# Patient Record
Sex: FEMALE | Race: WHITE | NOT HISPANIC OR LATINO | URBAN - METROPOLITAN AREA
[De-identification: names, ages, dates, MRNs, and addresses within clinical notes are randomized per-mention and may not be internally consistent; named-entity substitution may affect disease eponyms.]

---

## 2021-05-27 ENCOUNTER — CLAIMS CREATED FROM THE CLAIM WINDOW (OUTPATIENT)
Dept: URBAN - METROPOLITAN AREA CLINIC 113 | Facility: CLINIC | Age: 21
End: 2021-05-27
Payer: COMMERCIAL

## 2021-05-27 ENCOUNTER — WEB ENCOUNTER (OUTPATIENT)
Dept: URBAN - METROPOLITAN AREA CLINIC 113 | Facility: CLINIC | Age: 21
End: 2021-05-27

## 2021-05-27 VITALS
HEIGHT: 64 IN | HEART RATE: 72 BPM | WEIGHT: 194 LBS | DIASTOLIC BLOOD PRESSURE: 72 MMHG | SYSTOLIC BLOOD PRESSURE: 111 MMHG | TEMPERATURE: 98.6 F | BODY MASS INDEX: 33.12 KG/M2

## 2021-05-27 DIAGNOSIS — R19.8 ALTERNATING CONSTIPATION AND DIARRHEA: ICD-10-CM

## 2021-05-27 DIAGNOSIS — R10.84 GENERALIZED ABDOMINAL PAIN: ICD-10-CM

## 2021-05-27 PROCEDURE — 74177 CT ABD & PELVIS W/CONTRAST: CPT | Performed by: INTERNAL MEDICINE

## 2021-05-27 PROCEDURE — 99244 OFF/OP CNSLTJ NEW/EST MOD 40: CPT | Performed by: INTERNAL MEDICINE

## 2021-05-27 RX ORDER — DICYCLOMINE HYDROCHLORIDE 10 MG/1
1 CAPSULE CAPSULE ORAL
Qty: 60 | Refills: 1 | OUTPATIENT
Start: 2021-05-27 | End: 2021-07-26

## 2021-05-27 NOTE — HPI-TODAY'S VISIT:
22yo female referred by Dr. Anitra Jackson for evaluation of abdominal pain. A copy of this document is being forwarded to the referring provider.  She reports a several year history of intermittent abdominal pain and fluctuating bowel habits.  She will go for days without a bowel movement, followed by 1-2 normal stools and then the onset of diarrhea which may last several days.  She started a daily probiotic 2 weeks ago, without change.  On occasion, Pepto-Bismol will provide temporary relief.  She complains of sharp, cramping mid to lower abdominal pain, which is often worsened prior to a bowel movement.  She does not always have relief with defecation.  She complains of the frequent urge to defecate without response.  She denies blood per rectum.  She does awaken during the night for a bowel movement about once every 2 weeks.  No unintentional weight loss.  Within the last several weeks, she has experienced nausea before and after meals.  She denies vomiting.  There is no family history of GI malignancy or IBD.

## 2021-05-29 LAB
A/G RATIO: 1.7
ALBUMIN: 4.4
ALKALINE PHOSPHATASE: 55
ALT (SGPT): 14
AST (SGOT): 19
BASO (ABSOLUTE): 0.1
BASOS: 1
BILIRUBIN, TOTAL: 0.4
BUN/CREATININE RATIO: 16
BUN: 11
CALCIUM: 9.5
CARBON DIOXIDE, TOTAL: 24
CHLORIDE: 102
CREATININE: 0.7
DEAMIDATED GLIADIN ABS, IGA: 4
DEAMIDATED GLIADIN ABS, IGG: 2
EGFR IF AFRICN AM: 141
EGFR IF NONAFRICN AM: 123
ENDOMYSIAL ANTIBODY IGA: NEGATIVE
EOS (ABSOLUTE): 0.3
EOS: 3
GLOBULIN, TOTAL: 2.6
GLUCOSE: 77
HEMATOCRIT: 40
HEMATOLOGY COMMENTS:: (no result)
HEMOGLOBIN: 13.8
IMMATURE CELLS: (no result)
IMMATURE GRANS (ABS): 0
IMMATURE GRANULOCYTES: 0
IMMUNOGLOBULIN A, QN, SERUM: 144
LIPASE: 20
LYMPHS (ABSOLUTE): 2.3
LYMPHS: 23
MCH: 30.4
MCHC: 34.5
MCV: 88
MONOCYTES(ABSOLUTE): 0.8
MONOCYTES: 8
NEUTROPHILS (ABSOLUTE): 6.4
NEUTROPHILS: 65
NRBC: (no result)
PLATELETS: 251
POTASSIUM: 4.1
PROTEIN, TOTAL: 7
RBC: 4.54
RDW: 12.8
SODIUM: 140
T-TRANSGLUTAMINASE (TTG) IGA: <2
T-TRANSGLUTAMINASE (TTG) IGG: 2
WBC: 9.9

## 2021-06-01 ENCOUNTER — TELEPHONE ENCOUNTER (OUTPATIENT)
Dept: URBAN - METROPOLITAN AREA CLINIC 113 | Facility: CLINIC | Age: 21
End: 2021-06-01

## 2021-06-15 ENCOUNTER — LAB OUTSIDE AN ENCOUNTER (OUTPATIENT)
Dept: URBAN - METROPOLITAN AREA CLINIC 113 | Facility: CLINIC | Age: 21
End: 2021-06-15

## 2021-06-15 ENCOUNTER — TELEPHONE ENCOUNTER (OUTPATIENT)
Dept: URBAN - METROPOLITAN AREA CLINIC 113 | Facility: CLINIC | Age: 21
End: 2021-06-15

## 2021-06-15 RX ORDER — DICYCLOMINE HYDROCHLORIDE 10 MG/1
1 CAPSULE CAPSULE ORAL
Qty: 60 | Refills: 1 | Status: ACTIVE | COMMUNITY
Start: 2021-05-27 | End: 2021-07-26

## 2021-06-15 RX ORDER — SODIUM, POTASSIUM,MAG SULFATES 17.5-3.13G
354 ML SOLUTION, RECONSTITUTED, ORAL ORAL ONCE
Qty: 354 MILLILITER | Refills: 0 | OUTPATIENT
Start: 2021-06-15 | End: 2021-06-16

## 2021-06-18 ENCOUNTER — ERX REFILL RESPONSE (OUTPATIENT)
Dept: URBAN - METROPOLITAN AREA CLINIC 113 | Facility: CLINIC | Age: 21
End: 2021-06-18

## 2021-06-18 RX ORDER — DICYCLOMINE HYDROCHLORIDE 10 MG/1
1 CAPSULE CAPSULE ORAL
Qty: 60 | Refills: 1

## 2021-07-08 ENCOUNTER — TELEPHONE ENCOUNTER (OUTPATIENT)
Dept: URBAN - METROPOLITAN AREA CLINIC 113 | Facility: CLINIC | Age: 21
End: 2021-07-08

## 2021-07-08 ENCOUNTER — OFFICE VISIT (OUTPATIENT)
Dept: URBAN - METROPOLITAN AREA SURGERY CENTER 25 | Facility: SURGERY CENTER | Age: 21
End: 2021-07-08
Payer: COMMERCIAL

## 2021-07-08 DIAGNOSIS — K63.5 BENIGN COLON POLYP: ICD-10-CM

## 2021-07-08 PROCEDURE — G8907 PT DOC NO EVENTS ON DISCHARG: HCPCS | Performed by: INTERNAL MEDICINE

## 2021-07-08 PROCEDURE — 45380 COLONOSCOPY AND BIOPSY: CPT | Performed by: INTERNAL MEDICINE

## 2021-07-08 RX ORDER — DICYCLOMINE HYDROCHLORIDE 20 MG/1
1 TABLET TABLET ORAL
Qty: 45 | Refills: 6 | OUTPATIENT
Start: 2021-07-08 | End: 2022-02-02

## 2021-07-08 RX ORDER — DICYCLOMINE HYDROCHLORIDE 10 MG/1
1 CAPSULE CAPSULE ORAL
Qty: 60 | Refills: 1 | Status: ACTIVE | COMMUNITY

## 2021-07-22 ENCOUNTER — ERX REFILL RESPONSE (OUTPATIENT)
Dept: URBAN - METROPOLITAN AREA CLINIC 113 | Facility: CLINIC | Age: 21
End: 2021-07-22

## 2021-07-22 RX ORDER — DICYCLOMINE HYDROCHLORIDE 20 MG/1
1 TABLET TABLET ORAL
Qty: 45 | Refills: 6 | OUTPATIENT

## 2021-07-22 RX ORDER — DICYCLOMINE HYDROCHLORIDE 20 MG/1
TAKE 1 TABLET BY MOUTH 3 TIMES A DAY AS NEEDED FOR ABDOMINAL PAIN TABLET ORAL
Qty: 270 TABLET | Refills: 2 | OUTPATIENT

## 2021-08-17 ENCOUNTER — OFFICE VISIT (OUTPATIENT)
Dept: URBAN - METROPOLITAN AREA CLINIC 113 | Facility: CLINIC | Age: 21
End: 2021-08-17
Payer: COMMERCIAL

## 2021-08-17 VITALS
DIASTOLIC BLOOD PRESSURE: 72 MMHG | HEART RATE: 57 BPM | BODY MASS INDEX: 31.41 KG/M2 | TEMPERATURE: 97.5 F | HEIGHT: 64 IN | RESPIRATION RATE: 18 BRPM | SYSTOLIC BLOOD PRESSURE: 112 MMHG | WEIGHT: 184 LBS

## 2021-08-17 DIAGNOSIS — K58.2 IRRITABLE BOWEL SYNDROME WITH BOTH CONSTIPATION AND DIARRHEA: ICD-10-CM

## 2021-08-17 PROBLEM — 10743008 IRRITABLE BOWEL SYNDROME: Status: ACTIVE | Noted: 2021-07-08

## 2021-08-17 PROCEDURE — 99214 OFFICE O/P EST MOD 30 MIN: CPT | Performed by: INTERNAL MEDICINE

## 2021-08-17 RX ORDER — DICYCLOMINE HYDROCHLORIDE 10 MG/1
1 CAPSULE CAPSULE ORAL
Qty: 60 | Refills: 1 | Status: ON HOLD | COMMUNITY

## 2021-08-17 RX ORDER — HYOSCYAMINE SULFATE 0.12 MG/1
PLACE 1 TABLET UNDER THE TONGUE AND ALLOW TO DISSOLVE AS NEEDED FOR ABDOMINAL PAIN TABLET SUBLINGUAL
Qty: 45 | Refills: 1 | OUTPATIENT
Start: 2021-08-17 | End: 2021-09-06

## 2021-08-17 RX ORDER — DICYCLOMINE HYDROCHLORIDE 20 MG/1
TAKE 1 TABLET BY MOUTH 3 TIMES A DAY AS NEEDED FOR ABDOMINAL PAIN TABLET ORAL
Qty: 270 TABLET | Refills: 2 | Status: ACTIVE | COMMUNITY

## 2021-08-17 NOTE — HPI-TODAY'S VISIT:
22yo female presenting for follow-up after colonoscopy. She was last seen 5/27/2021 for evaluation of chronic, intermittent abdominal pain and alternating bowel habits, suspected to be related to a functional bowel disorder.  Routine labs and a CT of the abdomen and pelvis were planned.  She was recommended a daily fiber supplement with MiraLAX as needed for bowel maintenance, and dicyclomine was provided to use as needed.  A diagnostic colonoscopy was considered. Labs 5/27/2021 show a normal CBC, CMP and lipase.  Negative celiac serologies. CT of the abdomen and pelvis with contrast 6/8/2021 showed mild to moderate stool burden but was otherwise unremarkable. Due to persistent symptoms, a diagnostic colonoscopy was performed on 7/8/2021.  Findings included a 3 mm polyp in the sigmoid colon.  Normal colonic mucosa status post random biopsies which were unremarkable.  Polypectomy pathology showed benign lymphoid aggregate.  She was recommended a low FODMAP diet with plan for repeat colonoscopy at age of 48 for screening. Her symptoms nearly subsided with strict adherence to a low-FODMAP diet. However, she tried gluten-free icecream at [employer] yesterday, and experienced the sudden recurrence of abdominal pain and diarrhea. The symptoms have lessened by the time of today's visit. She otherwise had a daily nonbloody stool with psyllium. No nausea or vomiting. Dicyclomine has not been helpful.

## 2021-09-30 ENCOUNTER — OFFICE VISIT (OUTPATIENT)
Dept: URBAN - METROPOLITAN AREA CLINIC 113 | Facility: CLINIC | Age: 21
End: 2021-09-30

## 2023-04-21 ENCOUNTER — LAB OUTSIDE AN ENCOUNTER (OUTPATIENT)
Dept: URBAN - METROPOLITAN AREA CLINIC 113 | Facility: CLINIC | Age: 23
End: 2023-04-21

## 2023-04-21 ENCOUNTER — OFFICE VISIT (OUTPATIENT)
Dept: URBAN - METROPOLITAN AREA CLINIC 113 | Facility: CLINIC | Age: 23
End: 2023-04-21
Payer: COMMERCIAL

## 2023-04-21 VITALS
HEART RATE: 66 BPM | TEMPERATURE: 97.7 F | BODY MASS INDEX: 34.83 KG/M2 | WEIGHT: 204 LBS | DIASTOLIC BLOOD PRESSURE: 71 MMHG | SYSTOLIC BLOOD PRESSURE: 103 MMHG | HEIGHT: 64 IN | RESPIRATION RATE: 16 BRPM

## 2023-04-21 DIAGNOSIS — R10.9 LEFT FLANK PAIN: ICD-10-CM

## 2023-04-21 DIAGNOSIS — K58.2 IRRITABLE BOWEL SYNDROME WITH BOTH CONSTIPATION AND DIARRHEA: ICD-10-CM

## 2023-04-21 DIAGNOSIS — R10.32 LEFT LOWER QUADRANT ABDOMINAL PAIN: ICD-10-CM

## 2023-04-21 PROCEDURE — 99214 OFFICE O/P EST MOD 30 MIN: CPT | Performed by: NURSE PRACTITIONER

## 2023-04-21 RX ORDER — CHLORDIAZEPOXIDE HYDROCHLORIDE AND CLIDINIUM BROMIDE 5; 2.5 MG/1; MG/1
TAKE 1 CAPSULE 3 TIMES DAILY PRN ABDOMINAL PAINS CAPSULE, GELATIN COATED ORAL THREE TIMES A DAY
Qty: 90 | Refills: 2 | OUTPATIENT
Start: 2023-04-21 | End: 2023-07-20

## 2023-04-21 RX ORDER — DICYCLOMINE HYDROCHLORIDE 20 MG/1
TAKE 1 TABLET BY MOUTH 3 TIMES A DAY AS NEEDED FOR ABDOMINAL PAIN TABLET ORAL
Qty: 270 TABLET | Refills: 2 | Status: ACTIVE | COMMUNITY

## 2023-04-21 RX ORDER — AMITRIPTYLINE HYDROCHLORIDE 10 MG/1
1 TABLET AT BEDTIME TABLET, FILM COATED ORAL ONCE A DAY
Qty: 30 | Refills: 3 | OUTPATIENT
Start: 2023-04-21

## 2023-04-21 RX ORDER — DICYCLOMINE HYDROCHLORIDE 10 MG/1
1 CAPSULE CAPSULE ORAL
Qty: 60 | Refills: 1 | Status: ON HOLD | COMMUNITY

## 2023-04-21 NOTE — HPI-OTHER HISTORIES
A diagnostic colonoscopy was performed on 7/8/2021.  Findings included a 3 mm polyp in the sigmoid colon.  Normal colonic mucosa status post random biopsies which were unremarkable.  Polypectomy pathology showed benign lymphoid aggregate.  She was recommended a low FODMAP diet with plan for repeat colonoscopy at age of 48 for screening.  CT of the abdomen and pelvis with contrast 6/8/2021 showed mild to moderate stool burden but was otherwise unremarkable. Labs 5/27/2021 show a normal CBC, CMP and lipase.  Negative celiac serologies.
none

## 2023-04-21 NOTE — HPI-TODAY'S VISIT:
23yo female with irritable bowel syndrome presenting for long-interval follow-up. She was last seen in the office in August 2021 for follow-up regarding abdominal pain and alternating bowel habits related to irritable bowel syndrome.  Her symptoms had responded to a low FODMAP diet.  She was to continue daily fiber supplement (psyllium) and encouraged a lactose-free diet given symptom exacerbation associated with dairy consumption.  Hyoscyamine was provided to use as needed. Within the last few months, she has experienced a recurrence of abdominal pain. She complains of daily exacerbations of diffuse abdominal cramping which does respond to dicyclomine 20mg when needed. Unfortunately, she has been unable to tolerate a higher dose of dicyclomine, stating it makes her speech slurred. Dicyclomine 10mg does not cause this effect, but it does not alleviate her abdominal pain. A trial of Levsin has also not been helpful in the past. Within the last month, she has experienced localized left lower quadrant and left flank pain which is severe. The discomfort is not related to eating or bowel movements, and is unrelieved with dicyclomine. Her bowel habits continue to fluctuate. She will go 5 days without a bowel movement followed by multiple loose stools each day for about 3 days. She is taking Benefiber and a probiotic daily. She is following a low-FODMAP diet. She denies blood in the stool. She does admit to issues with anxiety.

## 2023-04-22 LAB
A/G RATIO: 1.4
ABSOLUTE BASOPHILS: 71
ABSOLUTE EOSINOPHILS: 245
ABSOLUTE LYMPHOCYTES: 2228
ABSOLUTE MONOCYTES: 782
ABSOLUTE NEUTROPHILS: 4574
ALBUMIN: 4.3
ALKALINE PHOSPHATASE: 53
ALT (SGPT): 12
AST (SGOT): 21
BASOPHILS: 0.9
BILIRUBIN, TOTAL: 0.5
BUN/CREATININE RATIO: (no result)
BUN: 14
CALCIUM: 9.1
CARBON DIOXIDE, TOTAL: 24
CHLORIDE: 103
CREATININE: 0.73
EGFR: 118
EOSINOPHILS: 3.1
GLOBULIN, TOTAL: 3
GLUCOSE: 76
HEMATOCRIT: 42.5
HEMOGLOBIN: 14.1
LIPASE: 21
LYMPHOCYTES: 28.2
MCH: 29.1
MCHC: 33.2
MCV: 87.8
MONOCYTES: 9.9
MPV: 8.6
NEUTROPHILS: 57.9
PLATELET COUNT: 236
POTASSIUM: 3.9
PROTEIN, TOTAL: 7.3
RDW: 12.8
RED BLOOD CELL COUNT: 4.84
SODIUM: 138
WHITE BLOOD CELL COUNT: 7.9

## 2023-07-21 ENCOUNTER — OFFICE VISIT (OUTPATIENT)
Dept: URBAN - METROPOLITAN AREA CLINIC 113 | Facility: CLINIC | Age: 23
End: 2023-07-21

## 2023-07-27 ENCOUNTER — DASHBOARD ENCOUNTERS (OUTPATIENT)
Age: 23
End: 2023-07-27

## 2023-07-27 ENCOUNTER — OFFICE VISIT (OUTPATIENT)
Dept: URBAN - METROPOLITAN AREA CLINIC 113 | Facility: CLINIC | Age: 23
End: 2023-07-27
Payer: COMMERCIAL

## 2023-07-27 VITALS
SYSTOLIC BLOOD PRESSURE: 111 MMHG | DIASTOLIC BLOOD PRESSURE: 79 MMHG | TEMPERATURE: 97.5 F | HEART RATE: 61 BPM | HEIGHT: 64 IN | WEIGHT: 200.4 LBS | BODY MASS INDEX: 34.21 KG/M2 | RESPIRATION RATE: 18 BRPM

## 2023-07-27 DIAGNOSIS — K58.2 IRRITABLE BOWEL SYNDROME WITH BOTH CONSTIPATION AND DIARRHEA: ICD-10-CM

## 2023-07-27 PROCEDURE — 99213 OFFICE O/P EST LOW 20 MIN: CPT | Performed by: NURSE PRACTITIONER

## 2023-07-27 RX ORDER — AMITRIPTYLINE HYDROCHLORIDE 10 MG/1
1 TABLET AT BEDTIME TABLET, FILM COATED ORAL ONCE A DAY
Qty: 30 | Refills: 3 | Status: ON HOLD | COMMUNITY
Start: 2023-04-21

## 2023-07-27 RX ORDER — DICYCLOMINE HYDROCHLORIDE 20 MG/1
TAKE 1 TABLET BY MOUTH 3 TIMES A DAY AS NEEDED FOR ABDOMINAL PAIN TABLET ORAL
Qty: 270 TABLET | Refills: 2 | Status: ON HOLD | COMMUNITY

## 2023-07-27 RX ORDER — DICYCLOMINE HYDROCHLORIDE 10 MG/1
1 CAPSULE CAPSULE ORAL
Qty: 60 | Refills: 1 | Status: ON HOLD | COMMUNITY

## 2023-07-27 RX ORDER — SERTRALINE HYDROCHLORIDE 50 MG/1
1 TABLET TABLET, FILM COATED ORAL ONCE A DAY
Status: ACTIVE | COMMUNITY

## 2023-07-27 RX ORDER — CHLORDIAZEPOXIDE HYDROCHLORIDE AND CLIDINIUM BROMIDE 5; 2.5 MG/1; MG/1
1 CAPSULE BEFORE MEALS CAPSULE ORAL TWICE A DAY
Status: ACTIVE | COMMUNITY

## 2023-07-27 NOTE — HPI-OTHER HISTORIES
A diagnostic colonoscopy was performed on 7/8/2021.  Findings included a 3 mm polyp in the sigmoid colon.  Normal colonic mucosa status post random biopsies which were unremarkable.  Polypectomy pathology showed benign lymphoid aggregate.  She was recommended a low FODMAP diet with plan for repeat colonoscopy at age of 48 for screening.  CT of the abdomen and pelvis with contrast 6/8/2021 showed mild to moderate stool burden but was otherwise unremarkable. Labs 5/27/2021 show a normal CBC, CMP and lipase.  Negative celiac serologies.

## 2023-07-27 NOTE — HPI-TODAY'S VISIT:
22yo female with irritable bowel syndrome presenting for follow-up. She was seen in the office in April for long interval follow-up of abdominal pain and alternating bowel habits secondary to irritable bowel syndrome.  Given recent exacerbation of localized left lower quadrant and flank pain, a CT of the abdomen and pelvis was planned to exclude an acute process.  She was to continue a daily fiber supplement and begin MiraLAX for management of constipation.  She was started on amitriptyline for modification of visceral hypersensitivity and Librax was provided to use as needed for relief of exacerbations of abdominal pain.  She was encouraged a low FODMAP diet. CT of the abdomen and pelvis with contrast 6/19/2023 showed no acute process. Labs 4/21/2023:CBC, CMP unremarkable.  Normal lipase. She did not start the amitriptyline due to difficulty with the nighttime dosing/administration. Her PCP started her on Zoloft for anxiety which has offered some relief of her abdominal symptoms. She continues to experience intermittent exacerbations of left sided abdominal pain which is not related to eating or bowel movements. This responds to Librax when needed. She has loose stools about every 3-4 days with MiraLAX 0.5 capful every other day.

## 2023-10-30 ENCOUNTER — TELEPHONE ENCOUNTER (OUTPATIENT)
Dept: URBAN - METROPOLITAN AREA CLINIC 113 | Facility: CLINIC | Age: 23
End: 2023-10-30

## 2023-10-30 RX ORDER — CHLORDIAZEPOXIDE HYDROCHLORIDE AND CLIDINIUM BROMIDE 5; 2.5 MG/1; MG/1: 1 CAPSULE BEFORE MEALS CAPSULE ORAL TWICE A DAY

## 2023-11-10 ENCOUNTER — WEB ENCOUNTER (OUTPATIENT)
Dept: URBAN - METROPOLITAN AREA CLINIC 113 | Facility: CLINIC | Age: 23
End: 2023-11-10

## 2023-11-10 RX ORDER — CHLORDIAZEPOXIDE HYDROCHLORIDE AND CLIDINIUM BROMIDE 5; 2.5 MG/1; MG/1
TAKE 1 CAPSULE 3 TIMES DAILY PRN ABDOMINAL PAINS CAPSULE, GELATIN COATED ORAL THREE TIMES A DAY
Qty: 90 | Refills: 2
Start: 2023-04-21 | End: 2024-02-08

## 2024-07-15 ENCOUNTER — OFFICE VISIT (OUTPATIENT)
Dept: URBAN - METROPOLITAN AREA CLINIC 113 | Facility: CLINIC | Age: 24
End: 2024-07-15

## 2024-07-18 ENCOUNTER — OFFICE VISIT (OUTPATIENT)
Dept: URBAN - METROPOLITAN AREA CLINIC 113 | Facility: CLINIC | Age: 24
End: 2024-07-18